# Patient Record
Sex: MALE | Race: ASIAN
[De-identification: names, ages, dates, MRNs, and addresses within clinical notes are randomized per-mention and may not be internally consistent; named-entity substitution may affect disease eponyms.]

---

## 2019-04-04 ENCOUNTER — HOSPITAL ENCOUNTER (EMERGENCY)
Dept: HOSPITAL 26 - MED | Age: 34
LOS: 1 days | Discharge: HOME | End: 2019-04-05
Payer: COMMERCIAL

## 2019-04-04 VITALS — WEIGHT: 180 LBS | BODY MASS INDEX: 23.1 KG/M2 | HEIGHT: 74 IN

## 2019-04-04 VITALS — DIASTOLIC BLOOD PRESSURE: 104 MMHG | SYSTOLIC BLOOD PRESSURE: 148 MMHG

## 2019-04-04 DIAGNOSIS — G40.909: Primary | ICD-10-CM

## 2019-04-04 DIAGNOSIS — F10.129: ICD-10-CM

## 2019-04-04 PROCEDURE — 85025 COMPLETE CBC W/AUTO DIFF WBC: CPT

## 2019-04-04 PROCEDURE — G0482 DRUG TEST DEF 15-21 CLASSES: HCPCS

## 2019-04-04 PROCEDURE — G0480 DRUG TEST DEF 1-7 CLASSES: HCPCS

## 2019-04-04 PROCEDURE — 96374 THER/PROPH/DIAG INJ IV PUSH: CPT

## 2019-04-04 PROCEDURE — 96372 THER/PROPH/DIAG INJ SC/IM: CPT

## 2019-04-04 PROCEDURE — 80053 COMPREHEN METABOLIC PANEL: CPT

## 2019-04-04 PROCEDURE — 70450 CT HEAD/BRAIN W/O DYE: CPT

## 2019-04-04 PROCEDURE — 36415 COLL VENOUS BLD VENIPUNCTURE: CPT

## 2019-04-04 PROCEDURE — 96361 HYDRATE IV INFUSION ADD-ON: CPT

## 2019-04-04 PROCEDURE — 85610 PROTHROMBIN TIME: CPT

## 2019-04-04 PROCEDURE — 99284 EMERGENCY DEPT VISIT MOD MDM: CPT

## 2019-04-04 PROCEDURE — 80305 DRUG TEST PRSMV DIR OPT OBS: CPT

## 2019-04-04 NOTE — NUR
DR ALVARADO AT BEDSIDE WHEN PATIENT STARTED SEIZURE LIKE ACTIVITY.

2335 PATIENT HAD 30 SECOND SEIZURE LIKE ACTIVITY.

2341 PATIENT HAD 28 SECOND SEIZURE LIKE ACTIVITY. 

2348 PATIENT HAD 30 SECOND SEIZURE LIKKE ACTIVITY. 

PATEINT CLOSELY MONITORED, ATIVAN GIVEN, ANSWERING QUESTIONS AAO.

## 2019-04-04 NOTE — NUR
RECEIVED CALL FROM PT'S WIFE HU WITH PT'S PERMISSION [phone], STATED 
PT HAS HX OF EPILEPSY (LAST SEIZURE 2 WEEKS AGO), CARDIAC ARREST (2014), HTN, 
HEPATIC FAILURE, TIA (1 MONTH AGO); RX MEDS DEPAKOTE, XARELTO, AND ATENOLOL

## 2019-04-04 NOTE — NUR
C/O FEELING AN AURA FOR SEIZURE WHILE DRIVING. REPORTS MILD SOB AND ANXIETY. 
REPORTS DRINKING 5 BEERS TONIGHT. PATIENT ACTING ALTERED AND ANXIOUS, DELAY IN 
ANSWER TO QUESTIONS AND FIGITING. DENIES DRUG USE.

## 2019-04-05 VITALS — DIASTOLIC BLOOD PRESSURE: 73 MMHG | SYSTOLIC BLOOD PRESSURE: 102 MMHG

## 2019-04-05 LAB
ALBUMIN FLD-MCNC: 3.6 G/DL (ref 3.4–5)
ANION GAP SERPL CALCULATED.3IONS-SCNC: 20 MMOL/L (ref 8–16)
APAP SERPL-MCNC: < 0.5 UG/ML (ref 10–30)
AST SERPL-CCNC: 13 U/L (ref 15–37)
BARBITURATES UR QL SCN: (no result) NG/ML
BASOPHILS # BLD AUTO: 0 K/UL (ref 0–0.22)
BASOPHILS NFR BLD AUTO: 0.4 % (ref 0–2)
BENZODIAZ UR QL SCN: (no result) NG/ML
BILIRUB SERPL-MCNC: 0.2 MG/DL (ref 0–1)
BUN SERPL-MCNC: 13 MG/DL (ref 7–18)
BZE UR QL SCN: NEGATIVE NG/ML
CANNABINOIDS UR QL SCN: (no result) NG/ML
CHLORIDE SERPL-SCNC: 108 MMOL/L (ref 98–107)
CO2 SERPL-SCNC: 19.4 MMOL/L (ref 21–32)
CREAT SERPL-MCNC: 1 MG/DL (ref 0.7–1.3)
EOSINOPHIL # BLD AUTO: 0.1 K/UL (ref 0–0.4)
EOSINOPHIL NFR BLD AUTO: 0.8 % (ref 0–4)
ERYTHROCYTE [DISTWIDTH] IN BLOOD BY AUTOMATED COUNT: 13.3 % (ref 11.6–13.7)
GFR SERPL CREATININE-BSD FRML MDRD: 111 ML/MIN (ref 90–?)
GLUCOSE SERPL-MCNC: 89 MG/DL (ref 74–106)
HCT VFR BLD AUTO: 45.1 % (ref 36–52)
HGB BLD-MCNC: 15.2 G/DL (ref 12–18)
LYMPHOCYTES # BLD AUTO: 2 K/UL (ref 2–11.5)
LYMPHOCYTES NFR BLD AUTO: 24.8 % (ref 20.5–51.1)
MCH RBC QN AUTO: 30 PG (ref 27–31)
MCHC RBC AUTO-ENTMCNC: 34 G/DL (ref 33–37)
MCV RBC AUTO: 88.4 FL (ref 80–94)
MONOCYTES # BLD AUTO: 0.7 K/UL (ref 0.8–1)
MONOCYTES NFR BLD AUTO: 8.2 % (ref 1.7–9.3)
NEUTROPHILS # BLD AUTO: 5.3 K/UL (ref 1.8–7.7)
NEUTROPHILS NFR BLD AUTO: 65.8 % (ref 42.2–75.2)
OPIATES UR QL SCN: (no result) NG/ML
PCP UR QL SCN: (no result) NG/ML
PLATELET # BLD AUTO: 251 K/UL (ref 140–450)
POTASSIUM SERPL-SCNC: 3.4 MMOL/L (ref 3.5–5.1)
PROTHROMBIN TIME: 9.6 SECS (ref 10.8–13.4)
RBC # BLD AUTO: 5.1 MIL/UL (ref 4.2–6.1)
SALICYLATES SERPL-MCNC: < 2.8 MG/DL (ref 2.8–20)
SODIUM SERPL-SCNC: 144 MMOL/L (ref 136–145)
WBC # BLD AUTO: 8.1 K/UL (ref 4.8–10.8)

## 2019-04-05 NOTE — NUR
PATIENT AROUSABLE TO NAME, UNABLE TO STAY AWAKE FOR LONG ENOUGH TO ANSWER 
QUESTIONS. DR ALVARADO MADE AWARE.

## 2019-04-05 NOTE — NUR
spoke with patients wife, stated she was currently out of the country, provided 
me with number for his mother [place], #[phone], spoke with her and she 
stated she would come pick him up.

## 2019-04-05 NOTE — NUR
MESSAGE LEFT WITH PATEINTS WIFE, FRANCISCO AT NUMBER LISTED IN CHART, REGARDING 
PATIENT NEEDING A RIDE.

## 2019-04-05 NOTE — NUR
PATIENT SLEEPING, AROUSABLE TO VOICE, NOT ABLE TO STAY AWAKE TO ANSWER 
QUESTIONS. VSS. DR ALVARADO MADE AWARE.

## 2020-01-02 ENCOUNTER — HOSPITAL ENCOUNTER (EMERGENCY)
Dept: HOSPITAL 26 - MED | Age: 35
Discharge: HOME | End: 2020-01-02
Payer: COMMERCIAL

## 2020-01-02 VITALS — DIASTOLIC BLOOD PRESSURE: 122 MMHG | SYSTOLIC BLOOD PRESSURE: 178 MMHG

## 2020-01-02 VITALS — SYSTOLIC BLOOD PRESSURE: 158 MMHG | DIASTOLIC BLOOD PRESSURE: 108 MMHG

## 2020-01-02 VITALS — WEIGHT: 177 LBS | HEIGHT: 73 IN | BODY MASS INDEX: 23.46 KG/M2

## 2020-01-02 DIAGNOSIS — F12.90: ICD-10-CM
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
## 2020-01-02 DIAGNOSIS — S52.122A: Primary | ICD-10-CM

## 2020-01-02 DIAGNOSIS — Z86.73: ICD-10-CM
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
## 2020-01-02 DIAGNOSIS — G40.909: ICD-10-CM
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
## 2020-01-02 LAB
BARBITURATES UR QL SCN: (no result) NG/ML
BENZODIAZ UR QL SCN: (no result) NG/ML
BZE UR QL SCN: (no result) NG/ML
CANNABINOIDS UR QL SCN: (no result) NG/ML
OPIATES UR QL SCN: (no result) NG/ML
PCP UR QL SCN: (no result) NG/ML

## 2020-01-02 PROCEDURE — 96372 THER/PROPH/DIAG INJ SC/IM: CPT

## 2020-01-02 PROCEDURE — 73070 X-RAY EXAM OF ELBOW: CPT

## 2020-01-02 PROCEDURE — 96374 THER/PROPH/DIAG INJ IV PUSH: CPT

## 2020-01-02 PROCEDURE — 96375 TX/PRO/DX INJ NEW DRUG ADDON: CPT

## 2020-01-02 PROCEDURE — 80305 DRUG TEST PRSMV DIR OPT OBS: CPT

## 2020-01-02 PROCEDURE — 29105 APPLICATION LONG ARM SPLINT: CPT

## 2020-01-02 PROCEDURE — 99284 EMERGENCY DEPT VISIT MOD MDM: CPT

## 2020-01-02 NOTE — NUR
C/O L ELBOW PAIN/DISLOCATION TODAY WHILE SKATEBOARDING. PT HAS REFERRAL AND CD 
FROM [facility]RGENT [facility] FOR REDUCTION. OBVIOUS DEFORMITY TO LUE. LUE CLAMMY/COOL TO 
TOUCH. CMS INTACT BUT PT DOES REPORT MILD NUMBNESS/TINGLING 



HX: AFIB, EPILEPSY, RENAL DISEASE, CVA 

RX: DEPAKOTE ER, ATENOLOL. DENIES N/V/D; SKIN IS PINK/WARM/DRY; AAOX4 WITH EVEN 
AND STEADY GAIT; LUNGS CLEAR BL; HR EVEN AND REGULAR; PT DENIES ANY FEVER, CP, 
SOB, OR COUGH AT THIS TIME; PATIENT STATES PAIN OF 10/10 AT THIS TIME; VSS; 
PATIENT POSITIONED FOR COMFORT; HOB ELEVATED; BEDRAILS UP X2; BED DOWN. ER MD 
MADE AWARE OF PT STATUS.FRIEND AT BEDSIDE.

## 2020-01-02 NOTE — NUR
POSTERIOR LONG ARM SPLINT PLACED ON PT L ARM, WRAPPED WITH ACE WRAP. SLING 
PLACED ON PT L ARM, FITTED TO PT SIZE. +CSM

## 2020-01-02 NOTE — NUR
PT DISCHARGED WITH PAPERWORK. EDUCATED PT REGARDING MEDICATIONS, D/C 
INSTRUCTIONS AND DIAGNOSIS. PT VERBALIZED UNDERSTANDING OF TEACHING. TOLD PT TO 
FOLLOW UP WITH PCP AND WHEN TO RETURN TO ED. PT AT STABLE CONDITION. ALL 
QUESTIONS ANSWERED.